# Patient Record
Sex: FEMALE | Race: WHITE | URBAN - NONMETROPOLITAN AREA
[De-identification: names, ages, dates, MRNs, and addresses within clinical notes are randomized per-mention and may not be internally consistent; named-entity substitution may affect disease eponyms.]

---
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
## 2024-11-23 ENCOUNTER — OFFICE VISIT (OUTPATIENT)
Dept: URGENT CARE | Facility: URGENT CARE | Age: 19
End: 2024-11-23
Payer: COMMERCIAL

## 2024-11-23 VITALS
BODY MASS INDEX: 24.16 KG/M2 | DIASTOLIC BLOOD PRESSURE: 71 MMHG | SYSTOLIC BLOOD PRESSURE: 109 MMHG | HEIGHT: 65 IN | OXYGEN SATURATION: 100 % | WEIGHT: 145 LBS | HEART RATE: 60 BPM | TEMPERATURE: 98.1 F | RESPIRATION RATE: 14 BRPM

## 2024-11-23 DIAGNOSIS — S00.451A EMBEDDED EARRING OF RIGHT EAR, INITIAL ENCOUNTER: Primary | ICD-10-CM

## 2024-11-23 PROCEDURE — 10120 INC&RMVL FB SUBQ TISS SMPL: CPT | Performed by: FAMILY MEDICINE

## 2024-11-23 RX ORDER — CEPHALEXIN 500 MG/1
500 CAPSULE ORAL 3 TIMES DAILY
Qty: 21 CAPSULE | Refills: 0 | Status: SHIPPED | OUTPATIENT
Start: 2024-11-23 | End: 2024-11-30

## 2024-11-23 NOTE — PROGRESS NOTES
ASSESSMENT/ PLAN:  Embedded earring of right ear, initial encounter     - cephALEXin (KEFLEX) 500 MG capsule; Take 1 capsule (500 mg) by mouth 3 times daily for 7 days.    Little erythema and bleeding after removal of the earring   the patient to monitor for signs or symptoms of  spreading infection.  Go to Urgent care or Emergency Department if worsening fevers/chills and/or spreading infection.  Warm, moist packs or towels can be applied to the region to aid in resolution of the infection.  Use Tylenol or ibuprofen for pain or fever.    Procedure note:    Skin was cleaned with alcohol.  The anterior and posterior right ear lobe was infiltrated with  0.8 ml of 2% lidocaine with epinephrine with good anesthesia.      The earring was pulled from the posterior side of the ear lobe with the small gold ball pulled through the skin.  No incision was performed to remove the earring.  Direct pressure was applied to control bleeding    No complications     ------------------------------------------------------------------------------------------------------------------------------------------    SUBJECTIVE:   Chief Complaint   Patient presents with    Ear Problem     Earring stuck in R-earlobe. Pt got it pierced over a month ago      Shima Medina is a 19 year old female who presents for evaluation of an embedded earring right earlobe with an area of   tenderness,   and warmth  of the earlobe  Patient has had pain for 1 day.       Therapies tried: none.    PMH:  No history of chronic health conditions     ALLERGIES:  Patient has no known allergies.    Current Outpatient Medications   Medication Sig Dispense Refill    cephALEXin (KEFLEX) 500 MG capsule Take 1 capsule (500 mg) by mouth 3 times daily for 7 days. 21 capsule 0     No current facility-administered medications for this visit.       Social History     Tobacco Use    Smoking status: Never    Smokeless tobacco: Never   Substance Use Topics    Alcohol use:  "Not on file       No family history on file.    ROS:  CONSTITUTIONAL:NEGATIVE for fever, chills,   INTEGUMENTARY/SKIN: NEGATIVE for worrisome rashes, or lesions  EYES: NEGATIVE for vision changes or irritation  RESP:NEGATIVE for significant cough or SOB    OBJECTIVE:  /71 (BP Location: Right arm, Patient Position: Chair, Cuff Size: Adult Regular)   Pulse 60   Temp 98.1  F (36.7  C) (Tympanic)   Resp 14   Ht 1.651 m (5' 5\")   Wt 65.8 kg (145 lb)   SpO2 100%   BMI 24.13 kg/m      SKIN: area involved is 1 cm by 1 cm on the right,  earlobe  -  earring is not visible anterior side with the earring protruding from the posterior ear lobe.  Tender to earring movement.   The skin appears little erythematous,   with tenderness and warmth to the touch.       GENERAL:  Alert, mild distress  EYES: EOMI,   conjunctiva clear  HENT: External ears with no swelling or lesions   Nose and lips without  Swelling, ulcers, erythema or lesions  NECK: normal pain free ROM  RESP: no labored respirations, no tachypnea  EXTREMITIES:   Full ROM without expression of pain or limitation x 4 extremities  NEURO: Normal strength and tone, ambulation without difficulty,   normal speech and mentation        "